# Patient Record
Sex: MALE | Race: WHITE | NOT HISPANIC OR LATINO | URBAN - METROPOLITAN AREA
[De-identification: names, ages, dates, MRNs, and addresses within clinical notes are randomized per-mention and may not be internally consistent; named-entity substitution may affect disease eponyms.]

---

## 2022-12-02 ENCOUNTER — INPATIENT (INPATIENT)
Facility: HOSPITAL | Age: 87
LOS: 2 days | Discharge: NURSING, REHAB OR LONG-TERM CARE | End: 2022-12-05
Attending: INTERNAL MEDICINE | Admitting: INTERNAL MEDICINE
Payer: MEDICARE

## 2022-12-02 VITALS
DIASTOLIC BLOOD PRESSURE: 63 MMHG | HEART RATE: 81 BPM | SYSTOLIC BLOOD PRESSURE: 118 MMHG | TEMPERATURE: 98 F | RESPIRATION RATE: 18 BRPM | OXYGEN SATURATION: 98 % | WEIGHT: 199.96 LBS

## 2022-12-02 PROCEDURE — 99285 EMERGENCY DEPT VISIT HI MDM: CPT

## 2022-12-02 NOTE — ED PROVIDER NOTE - OBJECTIVE STATEMENT
86 y/o M w/ PMH of parkinson's, prostate cancer presenting w/ weakness. Seen w/ daughter. Spoke w/ granddaughter (hospitalist) for collateral as well. Pt's daughter at bedside to assist w/ translation. Pt w/ worsening decline in functional status. Lives w/ elderly wife and only has aide for a few hours a day for a few times a week. has been having falls at home due to weakness. Last fall a few days ago, unwitnessed while wife was out of the house. Also w/ worsening leg swelling. had duplex performed in october that was negative for DVTs. No echo performed. Also w/ L heel wound that has not been healing. Pt endorsing difficulty sleeping, however denies specific complaints at this time. Denies fevers, chills, headache, dizziness, blurred vision, chest pain, cough, shortness of breath, abdominal pain, n/v/d/c, urinary symptoms, MSK pain, rash.

## 2022-12-02 NOTE — ED PROVIDER NOTE - PHYSICAL EXAMINATION
Gen: NAD, AOx3, able to make needs known, non-toxic  Head: NCAT  HEENT: EOMI, oral mucosa moist, normal conjunctiva  Lung: CTAB, no respiratory distress, no wheezes/rhonchi/rales B/L, speaking in full sentences  CV: RRR, no murmurs  Abd: non distended, soft, nontender, no guarding, no CVA tenderness  MSK: no visible deformities. no midline spinal tenderness. pelvis stable. LE edematous b/l  Neuro: Appears non focal  Skin: Warm, well perfused. ulcerated wound on L heel, stage 2-3  Psych: normal affect

## 2022-12-02 NOTE — ED PROVIDER NOTE - CLINICAL SUMMARY MEDICAL DECISION MAKING FREE TEXT BOX
88 y/o M w/ PMH as above presenting w/ weakness. Pt overall well appearing, no acute distress. Pt w/ worsening functional decline. Having frequent falls. Will obtain imaging to eval for traumatic findings. Screening labs to eval for metabolic vs. infectious abnormalities. Obtain duplex to r/o DVT. Screening EKG. Pt will require admission for PT eval at minimum. Will reassess the need for additional interventions as clinically warranted.

## 2022-12-02 NOTE — ED ADULT TRIAGE NOTE - CHIEF COMPLAINT QUOTE
pt presents to the ED BIB daughter with c/o unable to care for her father, states he lives with her elderly mother and he has been fall frequently, also c/o LLE wound non healing hx of advanced parkinson's & prostate cancer

## 2022-12-03 DIAGNOSIS — N32.81 OVERACTIVE BLADDER: ICD-10-CM

## 2022-12-03 DIAGNOSIS — R32 UNSPECIFIED URINARY INCONTINENCE: ICD-10-CM

## 2022-12-03 DIAGNOSIS — D63.8 ANEMIA IN OTHER CHRONIC DISEASES CLASSIFIED ELSEWHERE: ICD-10-CM

## 2022-12-03 DIAGNOSIS — G20 PARKINSON'S DISEASE: ICD-10-CM

## 2022-12-03 DIAGNOSIS — C88.4 EXTRANODAL MARGINAL ZONE B-CELL LYMPHOMA OF MUCOSA-ASSOCIATED LYMPHOID TISSUE [MALT-LYMPHOMA]: ICD-10-CM

## 2022-12-03 DIAGNOSIS — R26.2 DIFFICULTY IN WALKING, NOT ELSEWHERE CLASSIFIED: ICD-10-CM

## 2022-12-03 DIAGNOSIS — C61 MALIGNANT NEOPLASM OF PROSTATE: ICD-10-CM

## 2022-12-03 LAB
ALBUMIN SERPL ELPH-MCNC: 3.1 G/DL — LOW (ref 3.3–5)
ALP SERPL-CCNC: 81 U/L — SIGNIFICANT CHANGE UP (ref 40–120)
ALT FLD-CCNC: 16 U/L — SIGNIFICANT CHANGE UP (ref 12–78)
ANION GAP SERPL CALC-SCNC: 6 MMOL/L — SIGNIFICANT CHANGE UP (ref 5–17)
APPEARANCE UR: CLEAR — SIGNIFICANT CHANGE UP
AST SERPL-CCNC: 15 U/L — SIGNIFICANT CHANGE UP (ref 15–37)
BACTERIA # UR AUTO: ABNORMAL
BASOPHILS # BLD AUTO: 0.05 K/UL — SIGNIFICANT CHANGE UP (ref 0–0.2)
BASOPHILS NFR BLD AUTO: 0.8 % — SIGNIFICANT CHANGE UP (ref 0–2)
BILIRUB SERPL-MCNC: 0.4 MG/DL — SIGNIFICANT CHANGE UP (ref 0.2–1.2)
BILIRUB UR-MCNC: NEGATIVE — SIGNIFICANT CHANGE UP
BUN SERPL-MCNC: 34 MG/DL — HIGH (ref 7–23)
CALCIUM SERPL-MCNC: 8.8 MG/DL — SIGNIFICANT CHANGE UP (ref 8.5–10.1)
CHLORIDE SERPL-SCNC: 108 MMOL/L — SIGNIFICANT CHANGE UP (ref 96–108)
CO2 SERPL-SCNC: 27 MMOL/L — SIGNIFICANT CHANGE UP (ref 22–31)
COLOR SPEC: YELLOW — SIGNIFICANT CHANGE UP
CREAT SERPL-MCNC: 1.17 MG/DL — SIGNIFICANT CHANGE UP (ref 0.5–1.3)
DIFF PNL FLD: NEGATIVE — SIGNIFICANT CHANGE UP
EGFR: 61 ML/MIN/1.73M2 — SIGNIFICANT CHANGE UP
EOSINOPHIL # BLD AUTO: 0.22 K/UL — SIGNIFICANT CHANGE UP (ref 0–0.5)
EOSINOPHIL NFR BLD AUTO: 3.5 % — SIGNIFICANT CHANGE UP (ref 0–6)
EPI CELLS # UR: SIGNIFICANT CHANGE UP
FLUAV AG NPH QL: SIGNIFICANT CHANGE UP
FLUBV AG NPH QL: SIGNIFICANT CHANGE UP
GLUCOSE SERPL-MCNC: 114 MG/DL — HIGH (ref 70–99)
GLUCOSE UR QL: NEGATIVE MG/DL — SIGNIFICANT CHANGE UP
HCT VFR BLD CALC: 33.9 % — LOW (ref 39–50)
HGB BLD-MCNC: 10.8 G/DL — LOW (ref 13–17)
IMM GRANULOCYTES NFR BLD AUTO: 0.3 % — SIGNIFICANT CHANGE UP (ref 0–0.9)
KETONES UR-MCNC: NEGATIVE — SIGNIFICANT CHANGE UP
LEUKOCYTE ESTERASE UR-ACNC: NEGATIVE — SIGNIFICANT CHANGE UP
LYMPHOCYTES # BLD AUTO: 1.68 K/UL — SIGNIFICANT CHANGE UP (ref 1–3.3)
LYMPHOCYTES # BLD AUTO: 26.8 % — SIGNIFICANT CHANGE UP (ref 13–44)
MAGNESIUM SERPL-MCNC: 2.1 MG/DL — SIGNIFICANT CHANGE UP (ref 1.6–2.6)
MCHC RBC-ENTMCNC: 28.8 PG — SIGNIFICANT CHANGE UP (ref 27–34)
MCHC RBC-ENTMCNC: 31.9 G/DL — LOW (ref 32–36)
MCV RBC AUTO: 90.4 FL — SIGNIFICANT CHANGE UP (ref 80–100)
MONOCYTES # BLD AUTO: 0.68 K/UL — SIGNIFICANT CHANGE UP (ref 0–0.9)
MONOCYTES NFR BLD AUTO: 10.9 % — SIGNIFICANT CHANGE UP (ref 2–14)
NEUTROPHILS # BLD AUTO: 3.61 K/UL — SIGNIFICANT CHANGE UP (ref 1.8–7.4)
NEUTROPHILS NFR BLD AUTO: 57.7 % — SIGNIFICANT CHANGE UP (ref 43–77)
NITRITE UR-MCNC: NEGATIVE — SIGNIFICANT CHANGE UP
NRBC # BLD: 0 /100 WBCS — SIGNIFICANT CHANGE UP (ref 0–0)
NT-PROBNP SERPL-SCNC: 191 PG/ML — SIGNIFICANT CHANGE UP (ref 0–450)
PH UR: 6 — SIGNIFICANT CHANGE UP (ref 5–8)
PLATELET # BLD AUTO: 219 K/UL — SIGNIFICANT CHANGE UP (ref 150–400)
POTASSIUM SERPL-MCNC: 4.3 MMOL/L — SIGNIFICANT CHANGE UP (ref 3.5–5.3)
POTASSIUM SERPL-SCNC: 4.3 MMOL/L — SIGNIFICANT CHANGE UP (ref 3.5–5.3)
PROT SERPL-MCNC: 6.4 GM/DL — SIGNIFICANT CHANGE UP (ref 6–8.3)
PROT UR-MCNC: 15 MG/DL
RBC # BLD: 3.75 M/UL — LOW (ref 4.2–5.8)
RBC # FLD: 14.6 % — HIGH (ref 10.3–14.5)
RBC CASTS # UR COMP ASSIST: SIGNIFICANT CHANGE UP /HPF (ref 0–4)
SARS-COV-2 RNA SPEC QL NAA+PROBE: SIGNIFICANT CHANGE UP
SODIUM SERPL-SCNC: 141 MMOL/L — SIGNIFICANT CHANGE UP (ref 135–145)
SP GR SPEC: 1.02 — SIGNIFICANT CHANGE UP (ref 1.01–1.02)
UROBILINOGEN FLD QL: NEGATIVE MG/DL — SIGNIFICANT CHANGE UP
WBC # BLD: 6.26 K/UL — SIGNIFICANT CHANGE UP (ref 3.8–10.5)
WBC # FLD AUTO: 6.26 K/UL — SIGNIFICANT CHANGE UP (ref 3.8–10.5)
WBC UR QL: SIGNIFICANT CHANGE UP

## 2022-12-03 PROCEDURE — 99223 1ST HOSP IP/OBS HIGH 75: CPT

## 2022-12-03 PROCEDURE — 70450 CT HEAD/BRAIN W/O DYE: CPT | Mod: 26,MA

## 2022-12-03 PROCEDURE — 99221 1ST HOSP IP/OBS SF/LOW 40: CPT

## 2022-12-03 PROCEDURE — 72170 X-RAY EXAM OF PELVIS: CPT | Mod: 26

## 2022-12-03 PROCEDURE — 93970 EXTREMITY STUDY: CPT | Mod: 26

## 2022-12-03 PROCEDURE — 71045 X-RAY EXAM CHEST 1 VIEW: CPT | Mod: 26

## 2022-12-03 PROCEDURE — 70551 MRI BRAIN STEM W/O DYE: CPT | Mod: 26

## 2022-12-03 RX ORDER — INFLUENZA VIRUS VACCINE 15; 15; 15; 15 UG/.5ML; UG/.5ML; UG/.5ML; UG/.5ML
0.7 SUSPENSION INTRAMUSCULAR ONCE
Refills: 0 | Status: DISCONTINUED | OUTPATIENT
Start: 2022-12-03 | End: 2022-12-05

## 2022-12-03 RX ORDER — ACETAMINOPHEN 500 MG
650 TABLET ORAL EVERY 6 HOURS
Refills: 0 | Status: DISCONTINUED | OUTPATIENT
Start: 2022-12-03 | End: 2022-12-05

## 2022-12-03 RX ORDER — ROPINIROLE 8 MG/1
1 TABLET, FILM COATED, EXTENDED RELEASE ORAL THREE TIMES A DAY
Refills: 0 | Status: DISCONTINUED | OUTPATIENT
Start: 2022-12-03 | End: 2022-12-05

## 2022-12-03 RX ORDER — LOSARTAN POTASSIUM 100 MG/1
25 TABLET, FILM COATED ORAL DAILY
Refills: 0 | Status: DISCONTINUED | OUTPATIENT
Start: 2022-12-03 | End: 2022-12-05

## 2022-12-03 RX ORDER — LANOLIN ALCOHOL/MO/W.PET/CERES
3 CREAM (GRAM) TOPICAL AT BEDTIME
Refills: 0 | Status: DISCONTINUED | OUTPATIENT
Start: 2022-12-03 | End: 2022-12-05

## 2022-12-03 RX ORDER — CARBIDOPA AND LEVODOPA 25; 100 MG/1; MG/1
1 TABLET ORAL THREE TIMES A DAY
Refills: 0 | Status: DISCONTINUED | OUTPATIENT
Start: 2022-12-03 | End: 2022-12-05

## 2022-12-03 RX ORDER — ONDANSETRON 8 MG/1
4 TABLET, FILM COATED ORAL EVERY 8 HOURS
Refills: 0 | Status: DISCONTINUED | OUTPATIENT
Start: 2022-12-03 | End: 2022-12-05

## 2022-12-03 RX ORDER — ASPIRIN/CALCIUM CARB/MAGNESIUM 324 MG
81 TABLET ORAL DAILY
Refills: 0 | Status: DISCONTINUED | OUTPATIENT
Start: 2022-12-03 | End: 2022-12-05

## 2022-12-03 RX ORDER — TAMSULOSIN HYDROCHLORIDE 0.4 MG/1
0.4 CAPSULE ORAL AT BEDTIME
Refills: 0 | Status: DISCONTINUED | OUTPATIENT
Start: 2022-12-03 | End: 2022-12-05

## 2022-12-03 RX ADMIN — ROPINIROLE 1 MILLIGRAM(S): 8 TABLET, FILM COATED, EXTENDED RELEASE ORAL at 06:04

## 2022-12-03 RX ADMIN — Medication 81 MILLIGRAM(S): at 14:43

## 2022-12-03 RX ADMIN — CARBIDOPA AND LEVODOPA 1 TABLET(S): 25; 100 TABLET ORAL at 22:18

## 2022-12-03 RX ADMIN — ROPINIROLE 1 MILLIGRAM(S): 8 TABLET, FILM COATED, EXTENDED RELEASE ORAL at 22:18

## 2022-12-03 RX ADMIN — ROPINIROLE 1 MILLIGRAM(S): 8 TABLET, FILM COATED, EXTENDED RELEASE ORAL at 14:43

## 2022-12-03 RX ADMIN — TAMSULOSIN HYDROCHLORIDE 0.4 MILLIGRAM(S): 0.4 CAPSULE ORAL at 22:21

## 2022-12-03 RX ADMIN — LOSARTAN POTASSIUM 25 MILLIGRAM(S): 100 TABLET, FILM COATED ORAL at 22:21

## 2022-12-03 RX ADMIN — CARBIDOPA AND LEVODOPA 1 TABLET(S): 25; 100 TABLET ORAL at 14:44

## 2022-12-03 NOTE — PHYSICAL THERAPY INITIAL EVALUATION ADULT - GAIT DISTANCE, PT EVAL
4 side step to left with lot of difficulty, decresed push off in BLE, takes time to side step due to extreme stiffness in all joints

## 2022-12-03 NOTE — PHYSICAL THERAPY INITIAL EVALUATION ADULT - STRENGTHENING, PT EVAL
Pt will improve muscle strength in all extremities to WFL in 1 to 2 weeks to perform Gait & ADL with min Ax1

## 2022-12-03 NOTE — H&P ADULT - ASSESSMENT
This is and 88 yo M Ukrainian speaking, with Parkinsons disease, prostate CA s/p RT, MALTOMA s/p RT, mild dementia, presents in need of services. Pt ambulates unassisted but over the past several months has had a significant decline in mobility resulting in frequent falls and b/l LE edema with nonhealing wounds. He experiences nocturnal urgency and frequency as well as restless leg symptoms, resulting in frequently waking up to urinate and falling/wetting the bed. His 84 year old wife is no longer able to take care of him safely and frequently falls trying to help him go to the bathroom. Family seeking physical therapy and increased home care services (currently only have 4 hours of assistance). Pt is on low dose Ropinirole for parkinsons and has not tried higher dose or carbidopa/levodopa. he is on flomax 0.4 d but not on oxybutynin. he has no mobility assistance.     Please contact granddaughter Dr Loredo who is a hospitalist at [facility] with any questions [phone]    Parkinsons disease with impaired ambulation/deconditioning    prostate CA in remission with overactive bladder and nocturnal incontinence  chronic b/l LE edema with nonhealing wounds (likely due to lack on ambulation)  MALTOMA in remission   anemia  -resume home meds  -PT and Social work consult for increased services and rehab.   -Neuro consult for med adjustment   -wound care   -urology consult   -regular diet, scds        This is and 88 yo M Macedonian speaking, with Parkinsons disease, prostate CA s/p RT, MALTOMA s/p RT, mild dementia, presents in need of services. Pt ambulates unassisted but over the past several months has had a significant decline in mobility resulting in frequent falls and b/l LE edema with nonhealing wounds. He experiences nocturnal urgency and frequency as well as restless leg symptoms, resulting in frequently waking up to urinate and falling/wetting the bed. His 84 year old wife is no longer able to take care of him safely and frequently falls trying to help him go to the bathroom. Family seeking physical therapy and increased home care services (currently only have 4 hours of assistance). Pt is on low dose Ropinirole for parkinsons and has not tried higher dose or carbidopa/levodopa. he is on flomax 0.4 d but not on oxybutynin. he has no mobility assistance.     Please contact granddaughter Dr Loredo who is a hospitalist at [facility] with any questions [phone]    Parkinsons disease with impaired ambulation/deconditioning    prostate CA in remission with overactive bladder and nocturnal incontinence  chronic b/l LE edema with nonhealing wounds (likely due to lack on ambulation)  MALTOMA in remission   anemia  -resume home meds  -LE venous duplex (-) DVT. check TTE.   -PT and Social work consult for increased services and rehab.   -Neuro consult for med adjustment   -wound care   -urology consult   -regular diet, scds

## 2022-12-03 NOTE — CHART NOTE - NSCHARTNOTEFT_GEN_A_CORE
seen and examined  c/w current work up  urology/neuro notified to see patient  echo pending  PT c/s     Vital Signs Last 24 Hrs  T(C): 36.7 (03 Dec 2022 09:40), Max: 37.2 (03 Dec 2022 01:10)  T(F): 98.1 (03 Dec 2022 09:40), Max: 98.9 (03 Dec 2022 01:10)  HR: 67 (03 Dec 2022 09:40) (67 - 89)  BP: 129/66 (03 Dec 2022 09:40) (118/63 - 161/71)  BP(mean): --  RR: 18 (03 Dec 2022 09:40) (17 - 18)  SpO2: 94% (03 Dec 2022 09:40) (94% - 98%)    Parameters below as of 03 Dec 2022 09:40  Patient On (Oxygen Delivery Method): room air                          10.8   6.26  )-----------( 219      ( 02 Dec 2022 00:48 )             33.9     12-02    141  |  108  |  34<H>  ----------------------------<  114<H>  4.3   |  27  |  1.17    Ca    8.8      02 Dec 2022 00:48  Mg     2.1     12-02    TPro  6.4  /  Alb  3.1<L>  /  TBili  0.4  /  DBili  x   /  AST  15  /  ALT  16  /  AlkPhos  81  12-02      Urinalysis Basic - ( 02 Dec 2022 00:48 )    Color: Yellow / Appearance: Clear / S.020 / pH: x  Gluc: x / Ketone: Negative  / Bili: Negative / Urobili: Negative mg/dL   Blood: x / Protein: 15 mg/dL / Nitrite: Negative   Leuk Esterase: Negative / RBC: 0-2 /HPF / WBC 0-2   Sq Epi: x / Non Sq Epi: Few / Bacteria: Occasional

## 2022-12-03 NOTE — ED ADULT NURSE NOTE - OBJECTIVE STATEMENT
Received pt in the ED from triage.AOx3. C/o frequent fall, weakness,  LLE non healing wound. Pt's daughter at bedside. As per daughter,  pt lives w/ elderly wife and only has aide for a few hours a day for a few times a week. has been having falls at home due to weakness. Last fall a few days ago, unwitnessed while wife was out of the house. PMH of advanced Parkinson's disease, prostate cancer. Pt denied cp, sob, n/v/d, dizziness, no urinary symptoms. Speaks full sentences, unlabored breathing on RA.

## 2022-12-03 NOTE — PATIENT PROFILE ADULT - FUNCTIONAL ASSESSMENT - BASIC MOBILITY 6.
2-calculated by average/Not able to assess (calculate score using [facility] averaging method)

## 2022-12-03 NOTE — CONSULT NOTE ADULT - SUBJECTIVE AND OBJECTIVE BOX
HPI:  This is and 86 yo M Omani speaking, with Parkinsons disease, prostate CA s/p RT, MALTOMA s/p RT, mild dementia, presents in need of services. Pt ambulates unassisted but over the past several months has had a significant decline in mobility resulting in frequent falls and b/l LE edema with nonhealing wounds. He experiences nocturnal urgency and frequency as well as restless leg symptoms, resulting in frequently waking up to urinate and falling/wetting the bed. His 84 year old wife is no longer able to take care of him safely and frequently falls trying to help him go to the bathroom. Family seeking physical therapy and increased home care services (currently only have 4 hours of assistance). Pt is on low dose Ropinirole for parkinsons and has not tried carbidopa/levodopa. he is on flomax 0.4 d but not on oxybutynin. he has no mobility assistance.     Patient seen and examined at bedside. Poor historian but offers no complaints at this time. States he is voiding "fine".  Denies fever, chills, N/V/D, CP, SOB, hematuria, dysuria.    PAST MEDICAL & SURGICAL HISTORY:  within HPI    Review of Systems:  contained within HPI    MEDICATIONS  (STANDING):  aspirin enteric coated 81 milliGRAM(s) Oral daily  carbidopa/levodopa  25/100 1 Tablet(s) Oral three times a day  influenza  Vaccine (HIGH DOSE) 0.7 milliLiter(s) IntraMuscular once  losartan 25 milliGRAM(s) Oral daily  rOPINIRole 1 milliGRAM(s) Oral three times a day  tamsulosin 0.4 milliGRAM(s) Oral at bedtime    MEDICATIONS  (PRN):  acetaminophen     Tablet .. 650 milliGRAM(s) Oral every 6 hours PRN Temp greater or equal to 38C (100.4F), Mild Pain (1 - 3)  aluminum hydroxide/magnesium hydroxide/simethicone Suspension 30 milliLiter(s) Oral every 4 hours PRN Dyspepsia  melatonin 3 milliGRAM(s) Oral at bedtime PRN Insomnia  ondansetron Injectable 4 milliGRAM(s) IV Push every 8 hours PRN Nausea and/or Vomiting    Allergies    No Known Allergies    Intolerances    SOCIAL HISTORY          Smoking: Yes [ ]  No [X]   ______pk yrs          ETOH  Yes [ ]  No [X]  Social [ ]          DRUGS:  Yes [ ]  No [X]  if so what______________    FAMILY HISTORY:  No pertinent family history in first degree relatives    Vital Signs Last 24 Hrs  T(C): 36.8 (03 Dec 2022 11:20), Max: 37.2 (03 Dec 2022 01:10)  T(F): 98.2 (03 Dec 2022 11:20), Max: 98.9 (03 Dec 2022 01:10)  HR: 73 (03 Dec 2022 11:20) (67 - 89)  BP: 132/66 (03 Dec 2022 11:20) (118/63 - 161/71)  RR: 18 (03 Dec 2022 11:20) (17 - 18)  SpO2: 95% (03 Dec 2022 11:20) (94% - 98%)    Parameters below as of 03 Dec 2022 11:20  Patient On (Oxygen Delivery Method): room air    Physical Exam:  General:  Appears stated age, well-groomed, well-nourished, no distress  Eyes: NYA  HENT: WNL, no JVD  Chest: clear breath sounds  Cardiovascular: Regular rate & rhythm  Abdomen: soft, NT/ND  : no suprapubic tenderness or distention  Skin: No rashes or lesions  Musculoskeletal: normal strength  Neuro/Psych: AAO x1      LABS:                        10.8   6.26  )-----------( 219      ( 02 Dec 2022 00:48 )             33.9     12-    141  |  108  |  34<H>  ----------------------------<  114<H>  4.3   |  27  |  1.17    Ca    8.8      02 Dec 2022 00:48  Mg     2.1     12-    TPro  6.4  /  Alb  3.1<L>  /  TBili  0.4  /  DBili  x   /  AST  15  /  ALT  16  /  AlkPhos  81  12-02      Urinalysis Basic - ( 02 Dec 2022 00:48 )    Color: Yellow / Appearance: Clear / S.020 / pH: x  Gluc: x / Ketone: Negative  / Bili: Negative / Urobili: Negative mg/dL   Blood: x / Protein: 15 mg/dL / Nitrite: Negative   Leuk Esterase: Negative / RBC: 0-2 /HPF / WBC 0-2   Sq Epi: x / Non Sq Epi: Few / Bacteria: Occasional    A/P  86 yo M Omani speaking, with Parkinsons disease, prostate CA s/p RT, MALTOMA s/p RT, mild dementia, presents in need of services. With frequent nocturia and dysuria  - Dr. Huff to review full case and discuss with Dr. Loredo   - continue care per primary team   - monitor I&Os  - d/w Dr. Huff

## 2022-12-03 NOTE — CONSULT NOTE ADULT - ASSESSMENT
Parkinson's disease  Dementia  Frequent falls  Bilateral LE edema  Prostate cancer    - add Sinemet 25/100mg TID and continue Requip 1mg TID  - MRI brain wo steev to rule out pathologic process  - aspirin and statin for possible stroke prevention  - PT/OT and likely a good candidate for rehab  - discussed with Dr. Loredo.    Thank you for the consult.

## 2022-12-03 NOTE — PHYSICAL THERAPY INITIAL EVALUATION ADULT - BALANCE TRAINING, PT EVAL
Pt will improve static & dynamic standing balance to Fair+ using [Rolling walker]  to perform ADL, Gait  in 2 to 3 weeks

## 2022-12-03 NOTE — H&P ADULT - HISTORY OF PRESENT ILLNESS
This is and 88 yo M South Sudanese speaking, with Parkinsons disease, prostate CA s/p RT, MALTOMA s/p RT, mild dementia, presents in need of services. Pt ambulates unassisted but over the past several months has had a significant decline in mobility resulting in frequent falls and b/l LE edema with nonhealing wounds. He experiences nocturnal urgency and frequency as well as restless leg symptoms, resulting in frequently waking up to urinate and falling/wetting the bed. His 84 year old wife is no longer able to take care of him safely and frequently falls trying to help him go to the bathroom. Family seeking physical therapy and increased home care services (currently only have 4 hours of assistance). Pt is on low dose Ropinirole for parkinsons and has not tried carbidopa/levodopa. he is on flomax 0.4 d but not on oxybutynin. he has no mobility assistance.     Please contact granddaughter Dr Loredo who is a hospitalist at [place] with any questions [phone]

## 2022-12-03 NOTE — PHYSICAL THERAPY INITIAL EVALUATION ADULT - ADDITIONAL COMMENTS
As per pt he lives with his spouse, grand daughter in an apartment with 4 steps to enter with no rails, no other steps . He was independent in all functional mobility using RW , but as per H& P notes pt was progressively declining, needs more assist

## 2022-12-03 NOTE — PATIENT PROFILE ADULT - FALL HARM RISK - HARM RISK INTERVENTIONS
Assistance with ambulation/Assistance OOB with selected safe patient handling equipment/Communicate Risk of Fall with Harm to all staff/Discuss with provider need for PT consult/Monitor gait and stability/Reinforce activity limits and safety measures with patient and family/Tailored Fall Risk Interventions/Visual Cue: Yellow wristband and red socks/Bed in lowest position, wheels locked, appropriate side rails in place/Call bell, personal items and telephone in reach/Instruct patient to call for assistance before getting out of bed or chair/Non-slip footwear when patient is out of bed/[place] to call system/Physically safe environment - no spills, clutter or unnecessary equipment/Purposeful Proactive Rounding/Room/bathroom lighting operational, light cord in reach

## 2022-12-03 NOTE — CONSULT NOTE ADULT - SUBJECTIVE AND OBJECTIVE BOX
HPI: 87 year old man with hx of Parkinson's disease, dementia, and prostate cancer presenting with frequent falls. Patient was diagnosed with PD in 2020 and started on Requip 1mg TID. He has not seen a neurologist in a while. Since ~June 2022 he has been declining in his mobility. He has been losing his balance and falling. He is not as active as he used to be. He has been on Requip since he was diagnosed and no adjustment has been made. CT head was unremarkable.     PMHx: Parkinson's disease, Dementia, Prostate cancer  PSHx; radiation therapy  FHx: none  Social Hx: non-smoker, no etoh, no illicit drug use  Allergies: NKDA  Meds: See EMR  ROS: unable to obtain due to dementia    Vitals: Temp 98.1F     RR 18     HR 67     /66  General: NAD  Neuro Exam: AOx2. Follows commands. No dysarthria. No aphasia. EOM intact. PERRL. VFF. Masked like facies. No facial droop. Tongue is midline. Palate elevates symmetrically. Shoulder shrug is intact. Finger to nose intact and unable to do heel to shin. Reflexes symmetric and toes down. Gait exam deferred. Able to raise both arms against gravity and no focal weakness.     NIHSS- 5    CT head and labs reviewed

## 2022-12-03 NOTE — PHYSICAL THERAPY INITIAL EVALUATION ADULT - TRANSFER TRAINING, PT EVAL
Pt will be able to perform sit to stand, stand pivot transfer using [RW] with min Ax1 in 2 to 3 weeks

## 2022-12-03 NOTE — PHYSICAL THERAPY INITIAL EVALUATION ADULT - DID THE PATIENT HAVE SURGERY?
As per H&P notes "86 yo M Turkish speaking, with Parkinsons disease, prostate CA s/p RT, MALTOMA s/p RT, mild dementia, presents in need of services. Pt ambulates unassisted but over the past several months has had a significant decline in mobility resulting in frequent falls and b/l LE edema with nonhealing wounds. He experiences nocturnal urgency and frequency as well as restless leg symptoms, resulting in frequently waking up to urinate and falling/wetting the bed/n/a

## 2022-12-03 NOTE — H&P ADULT - NSHPPHYSICALEXAM_GEN_ALL_CORE
constitutional: NAD AAOx3  HEENT: PERRLA EOMI  CV: RRR S1S2  Pulm: CTA b/l  GI: soft nontender nondistended + BS   Neuro: CN II-XII grossly intact   Musculoskeletal: b/l 3+ pedal edema

## 2022-12-03 NOTE — CONSULT NOTE ADULT - NS ATTEND AMEND GEN_ALL_CORE FT
as above    pt unable to provide any useful informaton re: history an current  issues    spoke with cammie(physician)    underwent XRT + LHRH 2014-path and EOD eval unknown    likelihood is that his urinary uregency is result of    1. Parkinson's   2. obstructive uropathy   3. XRT changes    current u/a essentially negative    would check pvr if possible    consider starting beta 3 agaonist-myrbetriq or gemtesa    would avoid anticholinergics

## 2022-12-04 PROCEDURE — 99232 SBSQ HOSP IP/OBS MODERATE 35: CPT

## 2022-12-04 RX ADMIN — CARBIDOPA AND LEVODOPA 1 TABLET(S): 25; 100 TABLET ORAL at 22:17

## 2022-12-04 RX ADMIN — Medication 81 MILLIGRAM(S): at 15:08

## 2022-12-04 RX ADMIN — ROPINIROLE 1 MILLIGRAM(S): 8 TABLET, FILM COATED, EXTENDED RELEASE ORAL at 22:17

## 2022-12-04 RX ADMIN — TAMSULOSIN HYDROCHLORIDE 0.4 MILLIGRAM(S): 0.4 CAPSULE ORAL at 22:17

## 2022-12-04 RX ADMIN — ROPINIROLE 1 MILLIGRAM(S): 8 TABLET, FILM COATED, EXTENDED RELEASE ORAL at 14:53

## 2022-12-04 RX ADMIN — ROPINIROLE 1 MILLIGRAM(S): 8 TABLET, FILM COATED, EXTENDED RELEASE ORAL at 06:47

## 2022-12-04 RX ADMIN — LOSARTAN POTASSIUM 25 MILLIGRAM(S): 100 TABLET, FILM COATED ORAL at 06:47

## 2022-12-04 RX ADMIN — CARBIDOPA AND LEVODOPA 1 TABLET(S): 25; 100 TABLET ORAL at 15:08

## 2022-12-04 RX ADMIN — CARBIDOPA AND LEVODOPA 1 TABLET(S): 25; 100 TABLET ORAL at 06:47

## 2022-12-04 NOTE — PROGRESS NOTE ADULT - ASSESSMENT
This is and 86 yo M Libyan speaking, with Parkinsons disease, prostate CA s/p RT, MALTOMA s/p RT, mild dementia, presents in need of services. Pt ambulates unassisted but over the past several months has had a significant decline in mobility resulting in frequent falls and b/l LE edema with nonhealing wounds. He experiences nocturnal urgency and frequency as well as restless leg symptoms, resulting in frequently waking up to urinate and falling/wetting the bed. His 84 year old wife is no longer able to take care of him safely and frequently falls trying to help him go to the bathroom. Family seeking physical therapy and increased home care services (currently only have 4 hours of assistance). Pt is on low dose Ropinirole for parkinsons and has not tried higher dose or carbidopa/levodopa. he is on flomax 0.4 d but not on oxybutynin. he has no mobility assistance.     Please contact granddaughter Dr Loredo who is a hospitalist at [place] with any questions [phone]    Parkinsons disease with impaired ambulation/deconditioning    prostate CA in remission with overactive bladder and nocturnal incontinence  chronic b/l LE edema with nonhealing wounds (likely due to lack on ambulation)  MALTOMA in remission   anemia  -resume home meds  -LE venous duplex (-) DVT. check TTE.   -PT and Social work consult for increased services and rehab. [facility] placement   -Neuro consult added sinemet   -wound care   -urology consult , can try myrbetriq as outpt, non formulary   -regular diet, scds

## 2022-12-04 NOTE — PROGRESS NOTE ADULT - SUBJECTIVE AND OBJECTIVE BOX
Neurology Progress Note    No acute events.     Neuro Exam: AOx2. States it is Sunday. No dysarthria. No facial droop. Masked like facies. Bradykinesia. Moving arms against gravity.     MRI brain- no acute process    A/P:  Parkinson's disease  Dementia  Frequent falls  Bilateral LE edema  Prostate cancer    - continue Sinemet 25/100mg TID and Requip 1mg TID  - MRI brain reviewed  - PT/OT and likely a good candidate for rehab

## 2022-12-04 NOTE — PROGRESS NOTE ADULT - SUBJECTIVE AND OBJECTIVE BOX
Patient seen and examined bedside resting comfortably.  No complaints offered.   Voiding spontaneously.  Denies hematuria and dysuria. Denies nausea and vomiting. Tolerating diet.  Denies chest pain, dyspnea, cough.    T(F): 97.8 (12-04-22 @ 05:04), Max: 98.2 (12-03-22 @ 11:20)  HR: 69 (12-04-22 @ 05:04) (69 - 78)  BP: 116/69 (12-04-22 @ 05:04) (113/70 - 136/68)  RR: 18 (12-04-22 @ 05:04) (18 - 18)  SpO2: 98% (12-04-22 @ 05:04) (95% - 98%)  Wt(kg): --  CAPILLARY BLOOD GLUCOSE          PHYSICAL EXAM:    General: NAD, alert and awake  HEENT: NCAT, EOMI, conjunctiva clear  Chest: nonlabored respirations, CTA b/l.  Abdomen: soft, NT/ND.   Extremities: Calf soft, nontender b/l.   : No suprapubic tenderness or bladder distention.    Random Bladder scan: 400 ml    LABS:        I&O's Detail    03 Dec 2022 07:01  -  04 Dec 2022 07:00  --------------------------------------------------------  IN:    Oral Fluid: 240 mL  Total IN: 240 mL    OUT:  Total OUT: 0 mL    Total NET: 240 mL          Impression: 87y Male with Parkinsons disease, prostate CA s/p XRT + LHRH 2014, MALTOMA s/p RT, mild dementia with c/o urgency and incontinence.      Plan:  - Random PVR today 400 ml on bladder scan. Unable to obtain accurate information from pt.  - May trial of myrbetriq and monitor PVR's.  No acute intervention.  Follow-up as outpatient. Patient seen and examined bedside resting comfortably.  No complaints offered.   Voiding spontaneously.  Denies hematuria and dysuria. Denies nausea and vomiting. Tolerating diet.  Denies chest pain, dyspnea, cough.    T(F): 97.8 (12-04-22 @ 05:04), Max: 98.2 (12-03-22 @ 11:20)  HR: 69 (12-04-22 @ 05:04) (69 - 78)  BP: 116/69 (12-04-22 @ 05:04) (113/70 - 136/68)  RR: 18 (12-04-22 @ 05:04) (18 - 18)  SpO2: 98% (12-04-22 @ 05:04) (95% - 98%)  Wt(kg): --  CAPILLARY BLOOD GLUCOSE          PHYSICAL EXAM:    General: NAD, alert and awake  HEENT: NCAT, EOMI, conjunctiva clear  Chest: nonlabored respirations, CTA b/l.  Abdomen: soft, NT/ND.   Extremities: Calf soft, nontender b/l.   : No suprapubic tenderness or bladder distention.    Random Bladder scan: 400 ml    LABS:        I&O's Detail    03 Dec 2022 07:01  -  04 Dec 2022 07:00  --------------------------------------------------------  IN:    Oral Fluid: 240 mL  Total IN: 240 mL    OUT:  Total OUT: 0 mL    Total NET: 240 mL          Impression: 87y Male with Parkinsons disease, prostate CA s/p XRT + LHRH 2014, MALTOMA s/p RT, mild dementia with c/o urgency and incontinence.      Plan:  - Random PVR today 400 ml on bladder scan. Unable to obtain accurate information from pt.  - Increase Tamsulosin 0.4 mg to two capsules (0.8 mg) daily.  - daily bladder scans  No acute intervention.  Follow-up as outpatient.

## 2022-12-04 NOTE — PROGRESS NOTE ADULT - SUBJECTIVE AND OBJECTIVE BOX
Patient is a 87y old  Male who presents with a chief complaint of   INTERVAL HPI/OVERNIGHT EVENTS: no events     MEDICATIONS  (STANDING):  aspirin enteric coated 81 milliGRAM(s) Oral daily  carbidopa/levodopa  25/100 1 Tablet(s) Oral three times a day  influenza  Vaccine (HIGH DOSE) 0.7 milliLiter(s) IntraMuscular once  losartan 25 milliGRAM(s) Oral daily  rOPINIRole 1 milliGRAM(s) Oral three times a day  tamsulosin 0.4 milliGRAM(s) Oral at bedtime    MEDICATIONS  (PRN):  acetaminophen     Tablet .. 650 milliGRAM(s) Oral every 6 hours PRN Temp greater or equal to 38C (100.4F), Mild Pain (1 - 3)  aluminum hydroxide/magnesium hydroxide/simethicone Suspension 30 milliLiter(s) Oral every 4 hours PRN Dyspepsia  melatonin 3 milliGRAM(s) Oral at bedtime PRN Insomnia  ondansetron Injectable 4 milliGRAM(s) IV Push every 8 hours PRN Nausea and/or Vomiting    Allergies    No Known Allergies    Intolerances      REVIEW OF SYSTEMS:  All other systems reviewed and are negative    Vital Signs Last 24 Hrs  T(C): 36.6 (04 Dec 2022 05:04), Max: 36.8 (03 Dec 2022 11:20)  T(F): 97.8 (04 Dec 2022 05:04), Max: 98.2 (03 Dec 2022 11:20)  HR: 69 (04 Dec 2022 05:04) (69 - 78)  BP: 116/69 (04 Dec 2022 05:04) (113/70 - 136/68)  BP(mean): --  RR: 18 (04 Dec 2022 05:04) (18 - 18)  SpO2: 98% (04 Dec 2022 05:04) (95% - 98%)    Parameters below as of 04 Dec 2022 05:04  Patient On (Oxygen Delivery Method): room air      Daily     Daily   I&O's Summary    03 Dec 2022 07:01  -  04 Dec 2022 07:00  --------------------------------------------------------  IN: 240 mL / OUT: 0 mL / NET: 240 mL      CAPILLARY BLOOD GLUCOSE        PHYSICAL EXAM:  GENERAL: NAD,    HEAD:  Atraumatic, Normocephalic  EYES: EOMI, PERRLA, conjunctiva and sclera clear  ENMT: No tonsillar erythema, exudates, or enlargement; Moist mucous membranes, Good dentition, No lesions  NECK: Supple, No JVD, Normal thyroid  NERVOUS SYSTEM:  Alert & Oriented X3, Good concentration; Motor Strength 5/5 B/L upper and lower extremities; DTRs 2+ intact and symmetric  CHEST/LUNG: Clear to percussion bilaterally; No rales, rhonchi, wheezing, or rubs  HEART: Regular rate and rhythm; No murmurs, rubs, or gallops  ABDOMEN: Soft, Nontender, Nondistended; Bowel sounds present  EXTREMITIES:  2+ Peripheral Pulses, No clubbing, cyanosis, or edema  LYMPH: No lymphadenopathy noted  SKIN: No rashes or lesions    Labs                                DVT prophylaxis: > Lovenox 40mg SQ daily  > Heparin   > SCD's

## 2022-12-05 VITALS
DIASTOLIC BLOOD PRESSURE: 65 MMHG | SYSTOLIC BLOOD PRESSURE: 111 MMHG | HEART RATE: 79 BPM | TEMPERATURE: 98 F | OXYGEN SATURATION: 96 % | RESPIRATION RATE: 18 BRPM

## 2022-12-05 PROCEDURE — 99239 HOSP IP/OBS DSCHRG MGMT >30: CPT

## 2022-12-05 PROCEDURE — 99232 SBSQ HOSP IP/OBS MODERATE 35: CPT

## 2022-12-05 PROCEDURE — 93306 TTE W/DOPPLER COMPLETE: CPT | Mod: 26

## 2022-12-05 RX ORDER — TAMSULOSIN HYDROCHLORIDE 0.4 MG/1
1 CAPSULE ORAL
Qty: 0 | Refills: 0 | DISCHARGE
Start: 2022-12-05

## 2022-12-05 RX ORDER — ASPIRIN/CALCIUM CARB/MAGNESIUM 324 MG
1 TABLET ORAL
Qty: 0 | Refills: 0 | DISCHARGE
Start: 2022-12-05

## 2022-12-05 RX ORDER — LOSARTAN POTASSIUM 100 MG/1
1 TABLET, FILM COATED ORAL
Qty: 0 | Refills: 0 | DISCHARGE
Start: 2022-12-05

## 2022-12-05 RX ORDER — ROPINIROLE 8 MG/1
1 TABLET, FILM COATED, EXTENDED RELEASE ORAL
Qty: 0 | Refills: 0 | DISCHARGE
Start: 2022-12-05

## 2022-12-05 RX ORDER — LANOLIN ALCOHOL/MO/W.PET/CERES
1 CREAM (GRAM) TOPICAL
Qty: 0 | Refills: 0 | DISCHARGE
Start: 2022-12-05

## 2022-12-05 RX ORDER — CARBIDOPA AND LEVODOPA 25; 100 MG/1; MG/1
1 TABLET ORAL
Qty: 0 | Refills: 0 | DISCHARGE
Start: 2022-12-05

## 2022-12-05 RX ADMIN — ROPINIROLE 1 MILLIGRAM(S): 8 TABLET, FILM COATED, EXTENDED RELEASE ORAL at 14:48

## 2022-12-05 RX ADMIN — ROPINIROLE 1 MILLIGRAM(S): 8 TABLET, FILM COATED, EXTENDED RELEASE ORAL at 05:30

## 2022-12-05 RX ADMIN — CARBIDOPA AND LEVODOPA 1 TABLET(S): 25; 100 TABLET ORAL at 14:48

## 2022-12-05 RX ADMIN — Medication 81 MILLIGRAM(S): at 11:48

## 2022-12-05 RX ADMIN — CARBIDOPA AND LEVODOPA 1 TABLET(S): 25; 100 TABLET ORAL at 05:31

## 2022-12-05 RX ADMIN — LOSARTAN POTASSIUM 25 MILLIGRAM(S): 100 TABLET, FILM COATED ORAL at 05:30

## 2022-12-05 NOTE — DISCHARGE NOTE PROVIDER - NSDCCPCAREPLAN_GEN_ALL_CORE_FT
PRINCIPAL DISCHARGE DIAGNOSIS  Diagnosis: Weakness  Assessment and Plan of Treatment: Rehab placement  follow up with your primary care doctor

## 2022-12-05 NOTE — PROGRESS NOTE ADULT - SUBJECTIVE AND OBJECTIVE BOX
Neurology Progress Note    No acute events.     Neuro Exam: AOx2. States it is Monday. No dysarthria. No facial droop. Masked like facies. Bradykinesia. Moving arms against gravity. Mild cog-wheel rigidty.    MRI brain- no acute process    A/P:  Parkinson's disease  Dementia  Frequent falls  Bilateral LE edema  Prostate cancer    - continue Sinemet 25/100mg TID and Requip 1mg TID  - MRI brain reviewed  - PT/OT and likely a good candidate for rehab

## 2022-12-05 NOTE — PROGRESS NOTE ADULT - ASSESSMENT
Impression: 87y Male with Parkinsons disease, prostate CA s/p XRT + LHRH 2014, MALTOMA s/p RT, mild dementia with c/o urgency and incontinence.      Plan:    - can consider increasing Tamsulosin 0.4 mg to two capsules (0.8 mg) daily.  - daily bladder scans  - pt going to subacute rehab today  -No acute intervention.  -Follow-up as outpatient. Impression: 87y Male with Parkinsons disease, prostate CA s/p XRT + LHRH 2014, MALTOMA s/p RT, mild dementia with c/o urgency and incontinence.      Plan:    - can consider increasing Tamsulosin 0.4 mg to two capsules (0.8 mg) daily.  - daily bladder scans  - pt going to subacute rehab today  - No acute intervention.  - Follow-up as outpatient.

## 2022-12-05 NOTE — DISCHARGE NOTE PROVIDER - HOSPITAL COURSE
86 yo M [place] speaking, with Parkinsons disease, prostate CA s/p RT, MALTOMA s/p RT, mild dementia, presents in need of services. Pt ambulates unassisted but over the past several months has had a significant decline in mobility resulting in frequent falls and b/l LE edema with nonhealing wounds. He experiences nocturnal urgency and frequency as well as restless leg symptoms, resulting in frequently waking up to urinate and falling/wetting the bed. His 84 year old wife is no longer able to take care of him safely and frequently falls trying to help him go to the bathroom. Family seeking physical therapy and increased home care services (currently only have 4 hours of assistance). Pt is on low dose Ropinirole for parkinsons and has not tried higher dose or carbidopa/levodopa. he is on flomax 0.4 d but not on oxybutynin. he has no mobility assistance.     Please contact granddaughter Dr Loredo who is a hospitalist at [facility] with any questions [phone]    Parkinsons disease with impaired ambulation/deconditioning    prostate CA in remission with overactive bladder and nocturnal incontinence  chronic b/l LE edema with nonhealing wounds (likely due to lack on ambulation)  MALTOMA in remission   anemia  -resume home meds  -LE venous duplex (-) DVT. check TTE.   -PT and Social work consult for increased services and rehab. [facility] placement   -Neuro consult added sinemet   -wound care   -urology consult , can try myrbetriq as outpt, non formulary   -regular diet, scds         Wound Specialist Recommendations: Left Heel stage 2 pressure Injury:1. Clean the wound with gauze soaked in sterile saline2. Pat dry it with dry gauze3. Apply cavilon to the periwound area4. Apply hydrofiber (Aquacel not  Ag) to the wound bed5. Cover it with gauze6. Secure it with kerlix roll(gauze roll)7. Change the dressing every other day or when dressing is compromisedRight Lower led Mid shaft wound(venous stasis ulcer?):1. Clean the wound with gauze soaked in sterile saline2. Pat dry it with dry gauze3. Apply cavilon to the periwound area4. Apply hydrofiber (Aquacel not Ag) to the wound bed5. Cover it with gauze6. Secure it with Tegaderm(thin film)7. Change the dressing every other day or when dressing is compromisedChange of position every 2 hours highly recommended. CAIR Boots for both feet, elevation recommeded when supine          Problem/Plan - 1:  ·  Problem: Parkinsons disease.      Problem/Plan - 2:  ·  Problem: Prostate CA.      Problem/Plan - 3:  ·  Problem: Impaired ambulation.      Problem/Plan - 4:  ·  Problem: Overactive bladder.      Problem/Plan - 5:  ·  Problem: Incontinence.      Problem/Plan - 6:  ·  Problem: Anemia of chronic disease.      Problem/Plan - 7:  ·  Problem: MALToma.         pt seen and examined 45 min spent on dc planning     Lab test review, Radiology Review, Vitals review, Consultant review and discussion, Physical examination, IDR, Assessment and plan; Plan discussion with patient and family

## 2022-12-05 NOTE — DISCHARGE NOTE PROVIDER - NSDCMRMEDTOKEN_GEN_ALL_CORE_FT
aluminum hydroxide-magnesium hydroxide 200 mg-200 mg/5 mL oral suspension: 30 milliliter(s) orally every 4 hours, As needed, Dyspepsia  aspirin 81 mg oral delayed release tablet: 1 tab(s) orally once a day  carbidopa-levodopa 25 mg-100 mg oral tablet: 1 tab(s) orally 3 times a day  losartan 25 mg oral tablet: 1 tab(s) orally once a day  melatonin 3 mg oral tablet: 1 tab(s) orally once a day (at bedtime), As needed, Insomnia  rOPINIRole 1 mg oral tablet: 1 tab(s) orally 3 times a day  tamsulosin 0.4 mg oral capsule: 1 cap(s) orally once a day (at bedtime)

## 2022-12-05 NOTE — DISCHARGE NOTE NURSING/CASE MANAGEMENT/SOCIAL WORK - PATIENT PORTAL LINK FT
You can access the FollowMyHealth Patient Portal offered by [facility] by registering at the following website: [email or web address]. By joining [facility]’s FollowMyHealth portal, you will also be able to view your health information using other applications (apps) compatible with our system.

## 2022-12-05 NOTE — DISCHARGE NOTE PROVIDER - NSDCFUADDINST_GEN_ALL_CORE_FT
Wound Specialist Recommendations: Left Heel stage 2 pressure Injury:1. Clean the wound with gauze soaked in sterile saline2. Pat dry it with dry gauze3. Apply cavilon to the periwound area4. Apply hydrofiber (Aquacel not  Ag) to the wound bed5. Cover it with gauze6. Secure it with kerlix roll(gauze roll)7. Change the dressing every other day or when dressing is compromisedRight Lower led Mid shaft wound(venous stasis ulcer?):1. Clean the wound with gauze soaked in sterile saline2. Pat dry it with dry gauze3. Apply cavilon to the periwound area4. Apply hydrofiber (Aquacel not Ag) to the wound bed5. Cover it with gauze6. Secure it with Tegaderm(thin film)7. Change the dressing every other day or when dressing is compromisedChange of position every 2 hours highly recommended. CAIR Boots for both feet, elevation recommeded when supine

## 2022-12-05 NOTE — PROGRESS NOTE ADULT - NS ATTEND AMEND GEN_ALL_CORE FT
Pt denies pain  states he voided one hr ago  Last  ml and asked RN to repeat  came back an hour later and he was gone with no PVR  Had explained to pt, if in retention, they might send him back from [facility] Tamsulosin 0.4 mg to two tabs daily: 0.8 mg qd

## 2022-12-05 NOTE — PROGRESS NOTE ADULT - SUBJECTIVE AND OBJECTIVE BOX
UROLOGY PROGRESS NOTE:     Subjective: Patient seen and examined at bedside.       Objective:  Vital signs  T(F): , Max: 98 (12-05-22 @ 05:25)  HR: 68 (12-05-22 @ 05:25)  BP: 125/68 (12-05-22 @ 05:25)  SpO2: 95% (12-05-22 @ 05:25)  Wt(kg): --    Output     I&O's Detail    04 Dec 2022 07:01  -  05 Dec 2022 07:00  --------------------------------------------------------  IN:    Oral Fluid: 480 mL  Total IN: 480 mL    OUT:  Total OUT: 0 mL    Total NET: 480 mL          Physical Exam:  Gen: no acute distress  Back: no CVAT b/l  Abd: soft nt nd  : wnl    Labs:

## 2022-12-09 DIAGNOSIS — G20 PARKINSON'S DISEASE: ICD-10-CM

## 2022-12-09 DIAGNOSIS — N32.81 OVERACTIVE BLADDER: ICD-10-CM

## 2022-12-09 DIAGNOSIS — D63.8 ANEMIA IN OTHER CHRONIC DISEASES CLASSIFIED ELSEWHERE: ICD-10-CM

## 2022-12-09 DIAGNOSIS — L89.622 PRESSURE ULCER OF LEFT HEEL, STAGE 2: ICD-10-CM

## 2022-12-09 DIAGNOSIS — F02.80 DEMENTIA IN OTHER DISEASES CLASSIFIED ELSEWHERE, UNSPECIFIED SEVERITY, WITHOUT BEHAVIORAL DISTURBANCE, PSYCHOTIC DISTURBANCE, MOOD DISTURBANCE, AND ANXIETY: ICD-10-CM

## 2022-12-09 DIAGNOSIS — Z92.3 PERSONAL HISTORY OF IRRADIATION: ICD-10-CM

## 2022-12-09 DIAGNOSIS — Z85.46 PERSONAL HISTORY OF MALIGNANT NEOPLASM OF PROSTATE: ICD-10-CM

## 2022-12-09 DIAGNOSIS — Z85.72 PERSONAL HISTORY OF NON-HODGKIN LYMPHOMAS: ICD-10-CM

## 2022-12-09 DIAGNOSIS — R60.0 LOCALIZED EDEMA: ICD-10-CM

## 2022-12-09 DIAGNOSIS — N13.9 OBSTRUCTIVE AND REFLUX UROPATHY, UNSPECIFIED: ICD-10-CM

## 2022-12-09 DIAGNOSIS — N39.44 NOCTURNAL ENURESIS: ICD-10-CM

## 2022-12-09 DIAGNOSIS — R29.6 REPEATED FALLS: ICD-10-CM

## 2022-12-09 DIAGNOSIS — G25.81 RESTLESS LEGS SYNDROME: ICD-10-CM

## 2022-12-09 DIAGNOSIS — Z20.822 CONTACT WITH AND (SUSPECTED) EXPOSURE TO COVID-19: ICD-10-CM

## 2024-02-12 NOTE — ED PROVIDER NOTE - WR ORDER DATE AND TIME 1
Surgical Office Location :   [facility]  [address] [place] [address]     02-Dec-2022 22:47
